# Patient Record
Sex: FEMALE | Race: WHITE
[De-identification: names, ages, dates, MRNs, and addresses within clinical notes are randomized per-mention and may not be internally consistent; named-entity substitution may affect disease eponyms.]

---

## 2017-09-11 ENCOUNTER — RESULT REVIEW (OUTPATIENT)
Age: 52
End: 2017-09-11

## 2018-06-29 ENCOUNTER — APPOINTMENT (OUTPATIENT)
Dept: FAMILY MEDICINE | Facility: CLINIC | Age: 53
End: 2018-06-29
Payer: COMMERCIAL

## 2018-06-29 VITALS
WEIGHT: 130.38 LBS | SYSTOLIC BLOOD PRESSURE: 122 MMHG | OXYGEN SATURATION: 99 % | HEART RATE: 99 BPM | DIASTOLIC BLOOD PRESSURE: 78 MMHG | HEIGHT: 64 IN | BODY MASS INDEX: 22.26 KG/M2 | TEMPERATURE: 98.7 F

## 2018-06-29 DIAGNOSIS — K62.5 HEMORRHAGE OF ANUS AND RECTUM: ICD-10-CM

## 2018-06-29 DIAGNOSIS — E55.9 VITAMIN D DEFICIENCY, UNSPECIFIED: ICD-10-CM

## 2018-06-29 DIAGNOSIS — K64.4 RESIDUAL HEMORRHOIDAL SKIN TAGS: ICD-10-CM

## 2018-06-29 PROCEDURE — 99214 OFFICE O/P EST MOD 30 MIN: CPT | Mod: 25

## 2018-06-29 PROCEDURE — G0444 DEPRESSION SCREEN ANNUAL: CPT

## 2018-07-13 NOTE — REVIEW OF SYSTEMS
[Fever] : no fever [Chills] : no chills [Chest Pain] : no chest pain [Palpitations] : no palpitations [Shortness Of Breath] : no shortness of breath [Cough] : no cough [FreeTextEntry7] : as per HPI

## 2018-07-13 NOTE — HISTORY OF PRESENT ILLNESS
[FreeTextEntry8] : \par 53 year old female presents for evaluation of right groin pressure and episodes of rectal bleeding.\par \par Right groin discomfort/pressure started a month ago, more of a constant pressure, improves when her legs are elevated\par No palpable lump to the region\par Feels burning sensation to the area, radiating down her leg\par Does report h/o low back pain/sciatica, not currently present\par \par also with h/o internal hemorrhoids\par 3 days ago had an episode of melena (dark stools)\par yesterday, while attempting to have a bowel movement, reported rectal bleeding (no stool produced), noticed blood in her stool \par No episodes of bleeding today\par No abdominal pain, does report mild abdominal cramping\par \par up to date with GYN, done Sep 2017

## 2018-07-13 NOTE — ASSESSMENT
[FreeTextEntry1] : External hemorrhoid/rectal bleeding:\par advised to see colorectal surgery \par monitor for worsening symptoms\par \par Acute pain of right thigh:\par check ultrasound\par \par Health care maintenance:\par check blood work\par up to date with GYN

## 2018-07-13 NOTE — PHYSICAL EXAM
[No Acute Distress] : no acute distress [Well Nourished] : well nourished [Well Developed] : well developed [Well-Appearing] : well-appearing [Normal Appearance] : was normal in appearance [Neck Supple] : was supple [No Respiratory Distress] : no respiratory distress  [Clear to Auscultation] : lungs were clear to auscultation bilaterally [Normal Rhythm/Effort] : normal respiratory rhythm and effort [Normal Rate] : normal rate  [Regular Rhythm] : with a regular rhythm [Normal S1, S2] : normal S1 and S2 [No Murmur] : no murmur heard [No Edema] : there was no peripheral edema [Soft] : abdomen soft [Non Tender] : non-tender [Non-distended] : non-distended [Normal Bowel Sounds] : normal bowel sounds [No Rash] : no rash [Alert and Oriented x3] : oriented to person, place, and time [FreeTextEntry1] : large external hemorrhoid, no gross blood visualized [de-identified] : no tenderness or swelling to groin, upper thigh

## 2018-07-26 ENCOUNTER — OTHER (OUTPATIENT)
Age: 53
End: 2018-07-26

## 2018-09-10 ENCOUNTER — RESULT REVIEW (OUTPATIENT)
Age: 53
End: 2018-09-10

## 2019-02-27 ENCOUNTER — APPOINTMENT (OUTPATIENT)
Dept: FAMILY MEDICINE | Facility: CLINIC | Age: 54
End: 2019-02-27
Payer: COMMERCIAL

## 2019-02-27 VITALS
HEART RATE: 70 BPM | HEIGHT: 64 IN | OXYGEN SATURATION: 99 % | DIASTOLIC BLOOD PRESSURE: 74 MMHG | WEIGHT: 127 LBS | RESPIRATION RATE: 16 BRPM | SYSTOLIC BLOOD PRESSURE: 114 MMHG | BODY MASS INDEX: 21.68 KG/M2 | TEMPERATURE: 98.1 F

## 2019-02-27 PROCEDURE — 99213 OFFICE O/P EST LOW 20 MIN: CPT

## 2019-02-27 RX ORDER — CIPROFLOXACIN HYDROCHLORIDE 250 MG/1
250 TABLET, FILM COATED ORAL
Qty: 6 | Refills: 0 | Status: COMPLETED | COMMUNITY
Start: 2018-08-27

## 2019-02-27 NOTE — HISTORY OF PRESENT ILLNESS
[FreeTextEntry1] : Discuss sleep issues [de-identified] : \par Here to discuss sleep issues\par Since August 2018, has had difficulty falling and maintaining sleep\par In a perimenopausal state, did notice improvement in sleep during a month where she had her period\par Will go through months w/o a period\par Has had stressors, anxiety\par Has tried over the counter medications w/o improvement\par Looking for help\par \par Up to date with mammogram\par \par Last colonoscopy 11/17/12 with Dr Dandy Nam, normal\par Will look to f/u with GI for colonoscopy \par \par Has h/o benign leukopenia, has been evaluated by hematology/oncology

## 2019-02-27 NOTE — PHYSICAL EXAM
[No Acute Distress] : no acute distress [Well Nourished] : well nourished [Well Developed] : well developed [Well-Appearing] : well-appearing [Normal Appearance] : was normal in appearance [Neck Supple] : was supple [No Respiratory Distress] : no respiratory distress  [Clear to Auscultation] : lungs were clear to auscultation bilaterally [Normal Rate] : normal rate  [Regular Rhythm] : with a regular rhythm [Normal S1, S2] : normal S1 and S2 [No Murmur] : no murmur heard [Alert and Oriented x3] : oriented to person, place, and time [Normal Insight/Judgement] : insight and judgment were intact

## 2019-02-27 NOTE — ASSESSMENT
[FreeTextEntry1] : trial of Trazodone, risks and benefits discussed\par if no improvement, would consider alternative medications such as Zolpidem or Temazepam \par f/u with GYN

## 2019-03-01 ENCOUNTER — OTHER (OUTPATIENT)
Age: 54
End: 2019-03-01

## 2019-03-27 ENCOUNTER — RX RENEWAL (OUTPATIENT)
Age: 54
End: 2019-03-27

## 2019-03-27 RX ORDER — TRAZODONE HYDROCHLORIDE 50 MG/1
50 TABLET ORAL
Qty: 60 | Refills: 2 | Status: DISCONTINUED | COMMUNITY
Start: 2019-02-27 | End: 2019-03-27

## 2019-05-10 ENCOUNTER — RX RENEWAL (OUTPATIENT)
Age: 54
End: 2019-05-10

## 2019-07-01 ENCOUNTER — APPOINTMENT (OUTPATIENT)
Dept: FAMILY MEDICINE | Facility: CLINIC | Age: 54
End: 2019-07-01
Payer: COMMERCIAL

## 2019-07-01 VITALS
HEART RATE: 61 BPM | BODY MASS INDEX: 22.02 KG/M2 | WEIGHT: 129 LBS | RESPIRATION RATE: 16 BRPM | SYSTOLIC BLOOD PRESSURE: 110 MMHG | HEIGHT: 64 IN | DIASTOLIC BLOOD PRESSURE: 70 MMHG | OXYGEN SATURATION: 97 %

## 2019-07-01 DIAGNOSIS — S30.860A INSECT BITE (NONVENOMOUS) OF LOWER BACK AND PELVIS, INITIAL ENCOUNTER: ICD-10-CM
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
## 2019-07-01 DIAGNOSIS — G47.00 INSOMNIA, UNSPECIFIED: ICD-10-CM

## 2019-07-01 PROCEDURE — 99213 OFFICE O/P EST LOW 20 MIN: CPT | Mod: 25

## 2019-07-01 PROCEDURE — G0444 DEPRESSION SCREEN ANNUAL: CPT

## 2019-07-02 NOTE — ASSESSMENT
[FreeTextEntry1] : Insomnia- c/w Zolpidem as directed when needed,  reviewed\par Tick bite of back- check blood work\par Health care maintenance- up to date with GYN, up to date with mammogram, f/u with GI for colonoscopy \par \par form completed

## 2019-07-02 NOTE — REVIEW OF SYSTEMS
[Fever] : no fever [Chills] : no chills [Chest Pain] : no chest pain [Shortness Of Breath] : no shortness of breath [Cough] : no cough [de-identified] : as per HPI

## 2019-07-02 NOTE — HEALTH RISK ASSESSMENT
[0] : 2) Feeling down, depressed, or hopeless: Not at all (0) [Patient reported mammogram was normal] : Patient reported mammogram was normal [Patient reported colonoscopy was normal] : Patient reported colonoscopy was normal [MammogramDate] : 12/18 [ColonoscopyDate] : 11/12

## 2019-07-02 NOTE — PHYSICAL EXAM
[No Acute Distress] : no acute distress [Well Nourished] : well nourished [Well Developed] : well developed [Well-Appearing] : well-appearing [Normal Appearance] : was normal in appearance [Neck Supple] : was supple [No Respiratory Distress] : no respiratory distress  [Clear to Auscultation] : lungs were clear to auscultation bilaterally [Normal Rate] : normal rate  [Regular Rhythm] : with a regular rhythm [Normal S1, S2] : normal S1 and S2 [No Murmur] : no murmur heard [No Edema] : there was no peripheral edema [Soft] : abdomen soft [Normal Posterior Cervical Nodes] : no posterior cervical lymphadenopathy [Non Tender] : non-tender [Normal Anterior Cervical Nodes] : no anterior cervical lymphadenopathy [Normal Affect] : the affect was normal [Alert and Oriented x3] : oriented to person, place, and time [Normal Mood] : the mood was normal [de-identified] : bite alejandra to mid back with slight erythema

## 2019-07-02 NOTE — HISTORY OF PRESENT ILLNESS
[FreeTextEntry1] : Follow up [de-identified] : \par 54  year old female presents for follow up\par \par Has had sleep issues- doing well on Zolpidem\par no side effects\par \par had tick bite to her back a month ago\par believes tick may have been attached for a week\par had a reaction to the site but no Bull's eye rash \par no fever\par no joint pains\par did show her dermatologist\par \par [facility] [employer]- looking to get her associate's degree\par needs form completed regarding MMR titers (done in 2016)

## 2019-07-07 DIAGNOSIS — D25.9 LEIOMYOMA OF UTERUS, UNSPECIFIED: ICD-10-CM

## 2019-09-30 PROBLEM — D25.9 LEIOMYOMA OF UTERUS: Status: ACTIVE | Noted: 2019-09-30

## 2019-09-30 LAB — CYTOLOGY CVX/VAG DOC THIN PREP: NORMAL

## 2019-11-14 ENCOUNTER — APPOINTMENT (OUTPATIENT)
Dept: OBGYN | Facility: CLINIC | Age: 54
End: 2019-11-14
Payer: COMMERCIAL

## 2019-11-14 VITALS
HEIGHT: 64 IN | DIASTOLIC BLOOD PRESSURE: 82 MMHG | BODY MASS INDEX: 22.36 KG/M2 | SYSTOLIC BLOOD PRESSURE: 114 MMHG | WEIGHT: 131 LBS

## 2019-11-14 DIAGNOSIS — Z13.220 ENCOUNTER FOR SCREENING FOR LIPOID DISORDERS: ICD-10-CM

## 2019-11-14 DIAGNOSIS — Z01.419 ENCOUNTER FOR GYNECOLOGICAL EXAMINATION (GENERAL) (ROUTINE) W/OUT ABNORMAL FINDINGS: ICD-10-CM

## 2019-11-14 DIAGNOSIS — Z13.0 ENCOUNTER FOR SCREENING FOR OTHER SUSPECTED ENDOCRINE DISORDER: ICD-10-CM

## 2019-11-14 DIAGNOSIS — Z13.29 ENCOUNTER FOR SCREENING FOR OTHER SUSPECTED ENDOCRINE DISORDER: ICD-10-CM

## 2019-11-14 DIAGNOSIS — Z13.228 ENCOUNTER FOR SCREENING FOR OTHER SUSPECTED ENDOCRINE DISORDER: ICD-10-CM

## 2019-11-14 DIAGNOSIS — Z13.1 ENCOUNTER FOR SCREENING FOR DIABETES MELLITUS: ICD-10-CM

## 2019-11-14 DIAGNOSIS — M79.651 PAIN IN RIGHT THIGH: ICD-10-CM

## 2019-11-14 DIAGNOSIS — Z78.9 OTHER SPECIFIED HEALTH STATUS: ICD-10-CM

## 2019-11-14 DIAGNOSIS — Z11.59 ENCOUNTER FOR SCREENING FOR OTHER VIRAL DISEASES: ICD-10-CM

## 2019-11-14 DIAGNOSIS — Z13.21 ENCOUNTER FOR SCREENING FOR NUTRITIONAL DISORDER: ICD-10-CM

## 2019-11-14 PROCEDURE — 99396 PREV VISIT EST AGE 40-64: CPT

## 2019-11-14 NOTE — DISCUSSION/SUMMARY
[FreeTextEntry1] : 1. mammo and sono rx for screening (dense breasts)\par 2. DEXA rx given. We discussed the benefits of continued weight bearing exercise, calcium, and vit d supplementation to slow the progression of bone loss.\par 3. pt has colonoscopy scheduled for december.\par 4. pt doing well with menopause sxs- feels her exercise and diet helps. hot flashes are manageable.

## 2019-11-14 NOTE — HISTORY OF PRESENT ILLNESS
[Last Mammogram ___] : Last Mammogram was [unfilled] [Last Bone Density ___] : Last bone density studies [unfilled] [Last Colonoscopy ___] : Last colonoscopy [unfilled] [Last Pap ___] : Last cervical pap smear was [unfilled] [Definite:  ___ (Date)] : the last menstrual period was [unfilled] [Menarche Age: ____] : age at menarche was [unfilled] [Monogamous] : is monogamous [Sexually Active] : is sexually active [Contraception] : uses contraception [Partner Vasectomy] : has a partner with a vasectomy [Male ___] : [unfilled] male [Good] : being in good health [Hot Flashes] : hot flashes [Experiencing Menopausal Sxs] : experiencing menopausal symptoms [de-identified] : PELVIC US 08/29/2016

## 2019-11-14 NOTE — PHYSICAL EXAM
[Awake] : awake [Alert] : alert [Soft] : soft [Oriented x3] : oriented to person, place, and time [Normal] : uterus [Uterine Adnexae] : were not tender and not enlarged [Mass] : no breast mass [Nipple Discharge] : no nipple discharge [Axillary LAD] : no axillary lymphadenopathy [Tender] : non tender [Distended] : not distended [Motion Tenderness] : there was no cervical motion tenderness [Enlarged ___ wks] : not enlarged [Tenderness] : nontender [Mass ___ cm] : no uterine mass was palpated

## 2019-11-18 LAB — HPV HIGH+LOW RISK DNA PNL CVX: NOT DETECTED

## 2019-11-22 LAB — CYTOLOGY CVX/VAG DOC THIN PREP: NORMAL

## 2020-02-28 ENCOUNTER — RX RENEWAL (OUTPATIENT)
Age: 55
End: 2020-02-28

## 2020-03-16 ENCOUNTER — APPOINTMENT (OUTPATIENT)
Dept: OBGYN | Facility: CLINIC | Age: 55
End: 2020-03-16

## 2020-12-21 PROBLEM — Z01.419 ENCOUNTER FOR WELL WOMAN EXAM WITH ROUTINE GYNECOLOGICAL EXAM: Status: RESOLVED | Noted: 2019-11-14 | Resolved: 2020-12-21

## 2021-01-21 ENCOUNTER — APPOINTMENT (OUTPATIENT)
Dept: OBGYN | Facility: CLINIC | Age: 56
End: 2021-01-21
Payer: COMMERCIAL

## 2021-01-21 VITALS
HEIGHT: 64 IN | SYSTOLIC BLOOD PRESSURE: 110 MMHG | BODY MASS INDEX: 22.53 KG/M2 | TEMPERATURE: 96.8 F | DIASTOLIC BLOOD PRESSURE: 68 MMHG | WEIGHT: 132 LBS

## 2021-01-21 DIAGNOSIS — Z82.49 FAMILY HISTORY OF ISCHEMIC HEART DISEASE AND OTHER DISEASES OF THE CIRCULATORY SYSTEM: ICD-10-CM

## 2021-01-21 DIAGNOSIS — Z12.4 ENCOUNTER FOR SCREENING FOR MALIGNANT NEOPLASM OF CERVIX: ICD-10-CM

## 2021-01-21 DIAGNOSIS — R92.2 INCONCLUSIVE MAMMOGRAM: ICD-10-CM

## 2021-01-21 DIAGNOSIS — Z01.419 ENCOUNTER FOR GYNECOLOGICAL EXAMINATION (GENERAL) (ROUTINE) W/OUT ABNORMAL FINDINGS: ICD-10-CM

## 2021-01-21 DIAGNOSIS — Z80.0 FAMILY HISTORY OF MALIGNANT NEOPLASM OF DIGESTIVE ORGANS: ICD-10-CM

## 2021-01-21 DIAGNOSIS — Z12.39 ENCOUNTER FOR OTHER SCREENING FOR MALIGNANT NEOPLASM OF BREAST: ICD-10-CM

## 2021-01-21 DIAGNOSIS — Z13.820 ENCOUNTER FOR SCREENING FOR OSTEOPOROSIS: ICD-10-CM

## 2021-01-21 PROCEDURE — 99072 ADDL SUPL MATRL&STAF TM PHE: CPT

## 2021-01-21 PROCEDURE — 99396 PREV VISIT EST AGE 40-64: CPT

## 2021-01-21 RX ORDER — LACTOBACILLUS ACIDOPHILUS 0.5 MG
TABLET ORAL
Refills: 0 | Status: ACTIVE | COMMUNITY

## 2021-01-21 RX ORDER — OMEGA-3/DHA/EPA/FISH OIL 300-1000MG
CAPSULE ORAL
Refills: 0 | Status: ACTIVE | COMMUNITY

## 2021-01-21 NOTE — HISTORY OF PRESENT ILLNESS
[perimenopausal] : perimenopausal [Menarche Age: ____] : age at menarche was [unfilled] [N] : Patient does not use contraception [Y] : Patient is sexually active [Currently Active] : currently active [Men] : men [Vaginal] : vaginal [TextBox_4] : Shi is here for an annual exam.\par \par She denies gyn complaints today and feels well.  She is having some sleep disturbances over the last 18 mos but the nightly hot flashes are better. \par \par \par  [Mammogramdate] : 12/28/2019 [TextBox_19] : BR 1 [BreastSonogramDate] : 12/28/2019 [TextBox_25] : BR 2 [PapSmeardate] : 11/14/2019 [TextBox_31] : Negative [BoneDensityDate] : 10/16/2017 [TextBox_37] : Osteopenia [ColonoscopyDate] : 2019 [HPVDate] : 11/14/2019 [TextBox_78] : Not detected [LMPDate] : 4/24/2020 [PGHxTotal] : 3 [[facility]] : 3 [Banneriving] : 3 [TextBox_6] : 4/24/2020 [TextBox_9] : 15 [Hot Flashes] : hot flashes [Insomnia] : insomnia [No] : none [Herbal Options] : herbal options [Behavioral Modification] : behavioral modification [FreeTextEntry1] : 4/24/2020

## 2021-01-21 NOTE — REVIEW OF SYSTEMS
[Patient Intake Form Reviewed] : Patient intake form was reviewed [Negative] : Heme/Lymph [Fatigue] : fatigue

## 2021-01-21 NOTE — COUNSELING
[Nutrition/ Exercise/ Weight Management] : nutrition, exercise, weight management [Vitamins/Supplements] : vitamins/supplements [FreeTextEntry2] : The benefits of adequate calcium and vitamin D3 intake combined with weight bearing exercise for bone protection were reviewed.

## 2021-01-21 NOTE — PLAN
[FreeTextEntry1] : - mammo/sono rx provided\par - DEXA rx provided\par \par continue healthy diet, regular exercise.

## 2021-01-25 LAB — HPV HIGH+LOW RISK DNA PNL CVX: NOT DETECTED

## 2021-01-27 LAB — CYTOLOGY CVX/VAG DOC THIN PREP: NORMAL

## 2021-02-12 DIAGNOSIS — R92.8 OTHER ABNORMAL AND INCONCLUSIVE FINDINGS ON DIAGNOSTIC IMAGING OF BREAST: ICD-10-CM

## 2021-03-01 ENCOUNTER — NON-APPOINTMENT (OUTPATIENT)
Age: 56
End: 2021-03-01

## 2021-03-01 PROBLEM — R92.8 ABNORMAL ULTRASOUND OF BREAST: Status: ACTIVE | Noted: 2021-03-01

## 2021-04-20 ENCOUNTER — NON-APPOINTMENT (OUTPATIENT)
Age: 56
End: 2021-04-20

## 2022-06-13 ENCOUNTER — APPOINTMENT (OUTPATIENT)
Dept: OBGYN | Facility: CLINIC | Age: 57
End: 2022-06-13

## 2025-02-27 ENCOUNTER — OFFICE (OUTPATIENT)
Dept: URBAN - METROPOLITAN AREA CLINIC 12 | Facility: CLINIC | Age: 60
Setting detail: OPHTHALMOLOGY
End: 2025-02-27
Payer: COMMERCIAL

## 2025-02-27 DIAGNOSIS — H25.13: ICD-10-CM

## 2025-02-27 DIAGNOSIS — D31.01: ICD-10-CM

## 2025-02-27 DIAGNOSIS — H43.393: ICD-10-CM

## 2025-02-27 PROBLEM — H52.7 REFRACTIVE ERROR: Status: ACTIVE | Noted: 2025-02-27

## 2025-02-27 PROCEDURE — 92004 COMPRE OPH EXAM NEW PT 1/>: CPT | Performed by: OPHTHALMOLOGY

## 2025-02-27 ASSESSMENT — KERATOMETRY
OS_K1POWER_DIOPTERS: 42.25
OS_AXISANGLE_DEGREES: 084
METHOD_AUTO_MANUAL: AUTO
OD_K2POWER_DIOPTERS: 44.75
OD_K1POWER_DIOPTERS: 2.50
OS_K2POWER_DIOPTERS: 46.00
OD_AXISANGLE_DEGREES: 118

## 2025-02-27 ASSESSMENT — REFRACTION_MANIFEST
OS_ADD: +2.50
OS_SPHERE: +1.75
OD_AXIS: 040
OD_ADD: +2.50
OD_VA1: 20/20-
OD_SPHERE: +2.50
OS_VA1: 20/20
OS_CYLINDER: -4.00
OS_AXIS: 172
OD_CYLINDER: -2.50

## 2025-02-27 ASSESSMENT — REFRACTION_CURRENTRX
OS_VPRISM_DIRECTION: PROGS
OD_ADD: +2.00
OD_OVR_VA: 20/
OS_CYLINDER: -3.75
OS_OVR_VA: 20/
OD_AXIS: 042
OD_VPRISM_DIRECTION: PROGS
OS_SPHERE: +1.50
OD_SPHERE: +2.25
OS_ADD: +2.00
OS_AXIS: 171
OD_CYLINDER: -2.75

## 2025-02-27 ASSESSMENT — TONOMETRY
OS_IOP_MMHG: 16
OD_IOP_MMHG: 16

## 2025-02-27 ASSESSMENT — REFRACTION_AUTOREFRACTION
OS_CYLINDER: -4.25
OD_AXIS: 035
OD_SPHERE: +2.50
OS_SPHERE: +2.00
OS_AXIS: 174
OD_CYLINDER: -2.25

## 2025-02-27 ASSESSMENT — CONFRONTATIONAL VISUAL FIELD TEST (CVF)
OD_FINDINGS: FULL
OS_FINDINGS: FULL

## 2025-02-27 ASSESSMENT — VISUAL ACUITY
OD_BCVA: 20/20
OS_BCVA: 20/20-